# Patient Record
Sex: FEMALE | Race: WHITE | NOT HISPANIC OR LATINO | URBAN - METROPOLITAN AREA
[De-identification: names, ages, dates, MRNs, and addresses within clinical notes are randomized per-mention and may not be internally consistent; named-entity substitution may affect disease eponyms.]

---

## 2021-11-04 ENCOUNTER — TELEPHONE (OUTPATIENT)
Dept: HEMATOLOGY/ONCOLOGY | Facility: CLINIC | Age: 26
End: 2021-11-04
Payer: COMMERCIAL

## 2021-11-09 ENCOUNTER — OFFICE VISIT (OUTPATIENT)
Dept: HEMATOLOGY/ONCOLOGY | Facility: CLINIC | Age: 26
End: 2021-11-09
Payer: COMMERCIAL

## 2021-11-09 VITALS
WEIGHT: 101.63 LBS | TEMPERATURE: 98 F | DIASTOLIC BLOOD PRESSURE: 77 MMHG | RESPIRATION RATE: 16 BRPM | HEART RATE: 70 BPM | HEIGHT: 63 IN | OXYGEN SATURATION: 96 % | BODY MASS INDEX: 18.01 KG/M2 | SYSTOLIC BLOOD PRESSURE: 115 MMHG

## 2021-11-09 DIAGNOSIS — Z15.09 MLH1-RELATED LYNCH SYNDROME (HNPCC2): Primary | ICD-10-CM

## 2021-11-09 DIAGNOSIS — Z14.8 CARRIER OF GENE FOR LYNCH SYNDROME: ICD-10-CM

## 2021-11-09 PROCEDURE — 3078F DIAST BP <80 MM HG: CPT | Mod: CPTII,S$GLB,, | Performed by: NURSE PRACTITIONER

## 2021-11-09 PROCEDURE — 1159F MED LIST DOCD IN RCRD: CPT | Mod: CPTII,S$GLB,, | Performed by: NURSE PRACTITIONER

## 2021-11-09 PROCEDURE — 99205 OFFICE O/P NEW HI 60 MIN: CPT | Mod: S$GLB,,, | Performed by: NURSE PRACTITIONER

## 2021-11-09 PROCEDURE — 3008F BODY MASS INDEX DOCD: CPT | Mod: CPTII,S$GLB,, | Performed by: NURSE PRACTITIONER

## 2021-11-09 PROCEDURE — 1159F PR MEDICATION LIST DOCUMENTED IN MEDICAL RECORD: ICD-10-PCS | Mod: CPTII,S$GLB,, | Performed by: NURSE PRACTITIONER

## 2021-11-09 PROCEDURE — 3074F PR MOST RECENT SYSTOLIC BLOOD PRESSURE < 130 MM HG: ICD-10-PCS | Mod: CPTII,S$GLB,, | Performed by: NURSE PRACTITIONER

## 2021-11-09 PROCEDURE — 3008F PR BODY MASS INDEX (BMI) DOCUMENTED: ICD-10-PCS | Mod: CPTII,S$GLB,, | Performed by: NURSE PRACTITIONER

## 2021-11-09 PROCEDURE — 3078F PR MOST RECENT DIASTOLIC BLOOD PRESSURE < 80 MM HG: ICD-10-PCS | Mod: CPTII,S$GLB,, | Performed by: NURSE PRACTITIONER

## 2021-11-09 PROCEDURE — 3074F SYST BP LT 130 MM HG: CPT | Mod: CPTII,S$GLB,, | Performed by: NURSE PRACTITIONER

## 2021-11-09 PROCEDURE — 99205 PR OFFICE/OUTPT VISIT, NEW, LEVL V, 60-74 MIN: ICD-10-PCS | Mod: S$GLB,,, | Performed by: NURSE PRACTITIONER

## 2021-11-09 RX ORDER — ESCITALOPRAM OXALATE 20 MG/1
TABLET ORAL
COMMUNITY
Start: 2021-08-25 | End: 2022-03-15 | Stop reason: ALTCHOICE

## 2021-12-08 ENCOUNTER — TELEPHONE (OUTPATIENT)
Dept: HEMATOLOGY/ONCOLOGY | Facility: CLINIC | Age: 26
End: 2021-12-08
Payer: COMMERCIAL

## 2021-12-08 ENCOUNTER — PATIENT MESSAGE (OUTPATIENT)
Dept: HEMATOLOGY/ONCOLOGY | Facility: CLINIC | Age: 26
End: 2021-12-08
Payer: COMMERCIAL

## 2021-12-09 ENCOUNTER — TELEPHONE (OUTPATIENT)
Dept: HEMATOLOGY/ONCOLOGY | Facility: CLINIC | Age: 26
End: 2021-12-09
Payer: COMMERCIAL

## 2022-03-15 ENCOUNTER — OFFICE VISIT (OUTPATIENT)
Dept: GASTROENTEROLOGY | Facility: CLINIC | Age: 27
End: 2022-03-15
Payer: COMMERCIAL

## 2022-03-15 VITALS
HEART RATE: 97 BPM | HEIGHT: 63 IN | BODY MASS INDEX: 17.72 KG/M2 | SYSTOLIC BLOOD PRESSURE: 115 MMHG | OXYGEN SATURATION: 99 % | WEIGHT: 100 LBS | DIASTOLIC BLOOD PRESSURE: 76 MMHG

## 2022-03-15 DIAGNOSIS — R19.4 CHANGE IN BOWEL HABITS: ICD-10-CM

## 2022-03-15 DIAGNOSIS — K92.1 HEMATOCHEZIA: ICD-10-CM

## 2022-03-15 DIAGNOSIS — R53.83 FATIGUE, UNSPECIFIED TYPE: ICD-10-CM

## 2022-03-15 DIAGNOSIS — Z80.0 FAMILY HISTORY OF COLON CANCER: ICD-10-CM

## 2022-03-15 DIAGNOSIS — Z15.09 LYNCH SYNDROME: ICD-10-CM

## 2022-03-15 DIAGNOSIS — Z15.09 MLH1-RELATED LYNCH SYNDROME (HNPCC2): Primary | ICD-10-CM

## 2022-03-15 PROCEDURE — 3074F SYST BP LT 130 MM HG: CPT | Mod: CPTII,S$GLB,, | Performed by: INTERNAL MEDICINE

## 2022-03-15 PROCEDURE — 1160F RVW MEDS BY RX/DR IN RCRD: CPT | Mod: CPTII,S$GLB,, | Performed by: INTERNAL MEDICINE

## 2022-03-15 PROCEDURE — 3008F PR BODY MASS INDEX (BMI) DOCUMENTED: ICD-10-PCS | Mod: CPTII,S$GLB,, | Performed by: INTERNAL MEDICINE

## 2022-03-15 PROCEDURE — 3078F PR MOST RECENT DIASTOLIC BLOOD PRESSURE < 80 MM HG: ICD-10-PCS | Mod: CPTII,S$GLB,, | Performed by: INTERNAL MEDICINE

## 2022-03-15 PROCEDURE — 1160F PR REVIEW ALL MEDS BY PRESCRIBER/CLIN PHARMACIST DOCUMENTED: ICD-10-PCS | Mod: CPTII,S$GLB,, | Performed by: INTERNAL MEDICINE

## 2022-03-15 PROCEDURE — 99213 PR OFFICE/OUTPT VISIT, EST, LEVL III, 20-29 MIN: ICD-10-PCS | Mod: S$GLB,,, | Performed by: INTERNAL MEDICINE

## 2022-03-15 PROCEDURE — 1159F PR MEDICATION LIST DOCUMENTED IN MEDICAL RECORD: ICD-10-PCS | Mod: CPTII,S$GLB,, | Performed by: INTERNAL MEDICINE

## 2022-03-15 PROCEDURE — 1159F MED LIST DOCD IN RCRD: CPT | Mod: CPTII,S$GLB,, | Performed by: INTERNAL MEDICINE

## 2022-03-15 PROCEDURE — 3008F BODY MASS INDEX DOCD: CPT | Mod: CPTII,S$GLB,, | Performed by: INTERNAL MEDICINE

## 2022-03-15 PROCEDURE — 99213 OFFICE O/P EST LOW 20 MIN: CPT | Mod: S$GLB,,, | Performed by: INTERNAL MEDICINE

## 2022-03-15 PROCEDURE — 3078F DIAST BP <80 MM HG: CPT | Mod: CPTII,S$GLB,, | Performed by: INTERNAL MEDICINE

## 2022-03-15 PROCEDURE — 3074F PR MOST RECENT SYSTOLIC BLOOD PRESSURE < 130 MM HG: ICD-10-PCS | Mod: CPTII,S$GLB,, | Performed by: INTERNAL MEDICINE

## 2022-03-15 RX ORDER — VENLAFAXINE HYDROCHLORIDE 150 MG/1
CAPSULE, EXTENDED RELEASE ORAL
COMMUNITY
Start: 2021-11-30

## 2022-03-15 RX ORDER — SOD SULF/POT CHLORIDE/MAG SULF 1.479 G
12 TABLET ORAL DAILY
Qty: 24 TABLET | Refills: 0 | Status: SHIPPED | OUTPATIENT
Start: 2022-03-15 | End: 2023-06-05

## 2022-03-15 NOTE — LETTER
March 15, 2022        Karis Johnston MD  [address]  [address]  [facility]  [place] [address]             [facility]  [address] [address]  Phone: [phone]  Fax: [phone]   Patient: Yanique Quintanilla   MR Number: [id number]   YOB: 1995   Date of Visit: 3/15/2022       Dear Dr. Johnston:    Thank you for referring Yanique Quintanilla to me for evaluation. Attached you will find relevant portions of my assessment and plan of care.    If you have questions, please do not hesitate to call me. I look forward to following Yanique Quintanilla along with you.    Sincerely,      Carmen Enriquez MD            CC  No Recipients    Enclosure

## 2022-03-15 NOTE — PROGRESS NOTES
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 Diagnoses of Wood syndrome, Family history of colon cancer, Change in bowel habits, Hematochezia, and Fatigue, unspecified type were also pertinent to this visit.    PCP: Karis Johnston   [address] [facility] /*    HPI:  This is a 26 y.o. female who is being seen for a follow up visit. Patient with Wood syndrome. She reports yesterday having abdominal pain. She states BMs every 3-4 days, and used to be daily and now having pellet like stool x 2 months. She had bright red blood in stool last Tuesday but no blood since then. Denies melena. Reports feeling fatigued x month. Started taking Effexor months ago which was giving her energy but does not anymore.     Family Hx of colon cancer. Mother diagnosed at age 30  Last colonoscopy 3/26/2021: nl    Review of Systems   Constitutional: Positive for fatigue. Negative for chills, diaphoresis, fever and unexpected weight change.   HENT: Negative for mouth sores, nosebleeds, postnasal drip, sore throat, trouble swallowing and voice change.    Eyes: Negative for pain, discharge and eye dryness.   Respiratory: Positive for shortness of breath. Negative for apnea, cough, choking, chest tightness and wheezing.    Cardiovascular: Negative for chest pain, palpitations, leg swelling and claudication.   Gastrointestinal: Positive for abdominal pain, blood in stool, change in bowel habit, constipation and change in bowel habit. Negative for abdominal distention, anal bleeding, diarrhea, nausea, rectal pain, vomiting, reflux and fecal incontinence.   Genitourinary: Negative for bladder incontinence, difficulty urinating, dysuria, flank pain, frequency and hematuria.   Musculoskeletal: Negative for arthralgias, back pain, joint swelling and joint deformity.   Integumentary:  Negative for color change, rash and wound.  "  Allergic/Immunologic: Negative for environmental allergies and food allergies.   Neurological: Negative for seizures, facial asymmetry, speech difficulty, weakness, headaches and memory loss.   Hematological: Negative for adenopathy. Does not bruise/bleed easily.   Psychiatric/Behavioral: Negative for agitation, behavioral problems, confusion, hallucinations and sleep disturbance.      Past Medical History:   Diagnosis Date    Depression     Wood syndrome      Past Surgical History:   Procedure Laterality Date    COLONOSCOPY  03/2021    Due every year     Family History   Problem Relation Age of Onset    Colon cancer Mother     Colon cancer Maternal Uncle     Colon cancer Maternal Grandmother     Prostate cancer Maternal Grandfather      Social History     Tobacco Use    Smoking status: Never Smoker    Smokeless tobacco: Never Used   Substance Use Topics    Alcohol use: Yes     Comment: Socially    Drug use: Not Currently     Review of patient's allergies indicates:   Allergen Reactions    Penicillins       Medication List with Changes/Refills   New Medications    SOD SULF-POT CHLORIDE-MAG SULF (SUTAB) 1.479-0.188- 0.225 GRAM TABLET    Take 12 tablets by mouth once daily. Take according to package instructions with indicated amount of water. No breakfast day before test.   Current Medications    ESCITALOPRAM OXALATE (LEXAPRO) 20 MG TABLET        VENLAFAXINE (EFFEXOR-XR) 150 MG CP24             Vital Signs:  /76   Pulse 97   Ht 5' 3" (1.6 m)   Wt 45.4 kg (100 lb)   SpO2 99%   BMI 17.71 kg/m²   Body mass index is 17.71 kg/m².      Physical Exam  Vitals reviewed.   Constitutional:       General: She is awake. She is not in acute distress.     Appearance: Normal appearance. She is well-developed. She is not ill-appearing, toxic-appearing or diaphoretic.   HENT:      Head: Normocephalic and atraumatic.      Nose: Nose normal.      Mouth/Throat:      Mouth: Mucous membranes are moist.      " Pharynx: Oropharynx is clear. No oropharyngeal exudate or posterior oropharyngeal erythema.   Eyes:      General: Lids are normal. Gaze aligned appropriately. No scleral icterus.        Right eye: No discharge.         Left eye: No discharge.      Extraocular Movements: Extraocular movements intact.      Conjunctiva/sclera: Conjunctivae normal.   Neck:      Trachea: Trachea normal.   Cardiovascular:      Rate and Rhythm: Normal rate and regular rhythm.      Pulses:           Radial pulses are 2+ on the right side and 2+ on the left side.   Pulmonary:      Effort: Pulmonary effort is normal. No respiratory distress.      Breath sounds: Normal breath sounds. No stridor. No wheezing or rhonchi.   Chest:      Chest wall: No tenderness.   Abdominal:      General: Abdomen is flat. Bowel sounds are normal. There is no distension.      Palpations: Abdomen is soft. There is no fluid wave, hepatomegaly or mass.      Tenderness: There is no abdominal tenderness. There is no guarding or rebound.   Musculoskeletal:         General: No tenderness or deformity.      Cervical back: Full passive range of motion without pain and neck supple. No tenderness.      Right lower leg: No edema.      Left lower leg: No edema.   Lymphadenopathy:      Cervical: No cervical adenopathy.   Skin:     General: Skin is warm and dry.      Capillary Refill: Capillary refill takes less than 2 seconds.      Coloration: Skin is not cyanotic, jaundiced or pale.      Findings: No rash.   Neurological:      General: No focal deficit present.      Mental Status: She is alert and oriented to person, place, and time.      Cranial Nerves: No facial asymmetry.      Motor: No tremor.   Psychiatric:         Attention and Perception: Attention normal.         Mood and Affect: Mood and affect normal.         Speech: Speech normal.         Behavior: Behavior normal. Behavior is cooperative.          Labs: Pertinent labs reviewed.    All of the data above and below  has been reviewed by myself and any further interpretations will be reflected in the assessment and plan.   The data includes review of external notes, and independent interpretation of lab results, procedures, x-rays, and imaging reports.      Assessment:  MLH1-related Wood syndrome (HNPCC2)  -     Ambulatory Referral to External Surgery    Wood syndrome    Family history of colon cancer    Change in bowel habits  -     Ambulatory Referral to External Surgery  -     CBC Auto Differential; Future; Expected date: 03/15/2022  -     Comprehensive Metabolic Panel; Future; Expected date: 03/15/2022  -     TSH; Future; Expected date: 03/15/2022    Hematochezia  -     Ambulatory Referral to External Surgery    Fatigue, unspecified type  -     CBC Auto Differential; Future; Expected date: 03/15/2022  -     Comprehensive Metabolic Panel; Future; Expected date: 03/15/2022  -     TSH; Future; Expected date: 03/15/2022    Other orders  -     sod sulf-pot chloride-mag sulf (SUTAB) 1.479-0.188- 0.225 gram tablet; Take 12 tablets by mouth once daily. Take according to package instructions with indicated amount of water. No breakfast day before test.  Dispense: 24 tablet; Refill: 0    DDx includes tumor v hemorrhoid v infl.     Recommendations:  Schedule colonoscopy  Get blood work done.    Risks, benefits, and alternatives of medical management, any associated procedures, and/or treatment discussed with the patient. Patient given opportunity to ask questions and voices understanding. Patient has elected to proceed with the recommended care modalities as discussed.    No follow-ups on file.    Order summary:  Orders Placed This Encounter   Procedures    CBC Auto Differential    Comprehensive Metabolic Panel    TSH    Ambulatory Referral to External Surgery          Carmen Enriquez MD    This document was created using a voice recognition transcribing system. Incorrect words or phrases may have been missed during proofreading.  Please interpret accordingly or contact me for clarification.

## 2022-03-21 LAB
ABS NRBC COUNT: 0 X 10 3/UL (ref 0–0.01)
ABSOLUTE BASOPHIL: 0.03 X 10 3/UL (ref 0–0.22)
ABSOLUTE EOSINOPHIL: 0.04 X 10 3/UL (ref 0.04–0.54)
ABSOLUTE IMMATURE GRAN: 0.02 X 10 3/UL (ref 0–0.04)
ABSOLUTE LYMPHOCYTE: 2.38 X 10 3/UL (ref 0.86–4.75)
ABSOLUTE MONOCYTE: 0.47 X 10 3/UL (ref 0.22–1.08)
ALBUMIN SERPL-MCNC: 4.7 G/DL (ref 3.5–5.2)
ALBUMIN/GLOB SERPL ELPH: 2.1 {RATIO} (ref 1–2.7)
ALP ISOS SERPL LEV INH-CCNC: 74 U/L (ref 35–105)
ALT (SGPT): 10 U/L (ref 0–33)
ANION GAP SERPL CALC-SCNC: 11 MMOL/L (ref 8–17)
AST SERPL-CCNC: 12 U/L (ref 0–32)
BASOPHILS NFR BLD: 0.3 % (ref 0.2–1.2)
BILIRUBIN, TOTAL: 0.22 MG/DL (ref 0–1.2)
BUN/CREAT SERPL: 14.5 (ref 6–20)
CALCIUM SERPL-MCNC: 9.7 MG/DL (ref 8.6–10.2)
CARBON DIOXIDE, CO2: 29 MMOL/L (ref 22–29)
CHLORIDE: 103 MMOL/L (ref 98–107)
CREAT SERPL-MCNC: 0.56 MG/DL (ref 0.5–0.9)
EOSINOPHIL NFR BLD: 0.4 % (ref 0.7–7)
GFR ESTIMATION: 130.86
GLOBULIN: 2.2 G/DL (ref 1.5–4.5)
GLUCOSE: 95 MG/DL (ref 74–106)
HCT VFR BLD AUTO: 38.9 % (ref 37–47)
HGB BLD-MCNC: 12.5 G/DL (ref 12–16)
IMMATURE GRANULOCYTES: 0.2 % (ref 0–0.5)
LYMPHOCYTES NFR BLD: 25.7 % (ref 19.3–53.1)
MCH RBC QN AUTO: 28.1 PG (ref 27–32)
MCHC RBC AUTO-ENTMCNC: 32.1 G/DL (ref 32–36)
MCV RBC AUTO: 87.4 FL (ref 82–100)
MONOCYTES NFR BLD: 5.1 % (ref 4.7–12.5)
NEUTROPHILS # BLD AUTO: 6.32 X 10 3/UL (ref 2.15–7.56)
NEUTROPHILS NFR BLD: 68.3 % (ref 34–71.1)
NUCLEATED RED BLOOD CELLS: 0 /100 WBC (ref 0–0.2)
PLATELET # BLD AUTO: 302 X 10 3/UL (ref 135–400)
POTASSIUM: 3.8 MMOL/L (ref 3.5–5.1)
PROT SNV-MCNC: 6.9 G/DL (ref 6.4–8.3)
RBC # BLD AUTO: 4.45 X 10 6/UL (ref 4.2–5.4)
RDW-SD: 42.4 FL (ref 37–54)
SODIUM: 143 MMOL/L (ref 136–145)
TSH SERPL DL<=0.005 MIU/L-ACNC: 1.55 UIU/ML (ref 0.27–4.2)
UREA NITROGEN (BUN): 8.1 MG/DL (ref 6–20)
WBC # BLD: 9.26 X 10 3/UL (ref 4.3–10.8)

## 2022-03-23 NOTE — PROGRESS NOTES
CBC/CMP/TSH nl.   CMA to notify patient. Her blood tests look well including her blood counts, liver, kidneys and thyroid function. Keep procedure as scheduled.  NBP

## 2022-03-24 ENCOUNTER — TELEPHONE (OUTPATIENT)
Dept: GASTROENTEROLOGY | Facility: CLINIC | Age: 27
End: 2022-03-24
Payer: COMMERCIAL

## 2022-03-24 NOTE — TELEPHONE ENCOUNTER
I left a voicemail with the results and asking the patient to contact us with any questions.  SUZYL, XENIA

## 2022-03-24 NOTE — TELEPHONE ENCOUNTER
----- Message from Carmen Enriquez MD sent at 3/23/2022  6:27 AM CDT -----  CBC/CMP/TSH nl.   CMA to notify patient. Her blood tests look well including her blood counts, liver, kidneys and thyroid function. Keep procedure as scheduled.  NBP

## 2022-03-25 ENCOUNTER — TELEPHONE (OUTPATIENT)
Dept: ADMINISTRATIVE | Facility: CLINIC | Age: 27
End: 2022-03-25
Payer: COMMERCIAL

## 2022-03-31 ENCOUNTER — OUTSIDE PLACE OF SERVICE (OUTPATIENT)
Dept: GASTROENTEROLOGY | Facility: CLINIC | Age: 27
End: 2022-03-31
Payer: COMMERCIAL

## 2022-03-31 LAB — CRC RECOMMENDATION EXT: NORMAL

## 2022-03-31 PROCEDURE — 45385 COLONOSCOPY W/LESION REMOVAL: CPT | Mod: ,,, | Performed by: INTERNAL MEDICINE

## 2022-03-31 PROCEDURE — 45385 PR COLONOSCOPY,REMV LESN,SNARE: ICD-10-PCS | Mod: ,,, | Performed by: INTERNAL MEDICINE

## 2022-04-05 ENCOUNTER — TELEPHONE (OUTPATIENT)
Dept: GASTROENTEROLOGY | Facility: CLINIC | Age: 27
End: 2022-04-05

## 2022-04-05 NOTE — TELEPHONE ENCOUNTER
Results and recommendations discussed with patient, who voices understanding and agreement. They will contact us with any issues.  SUZYL, XENIA

## 2022-12-15 ENCOUNTER — OFFICE VISIT (OUTPATIENT)
Dept: MATERNAL FETAL MEDICINE | Facility: CLINIC | Age: 27
End: 2022-12-15
Payer: COMMERCIAL

## 2022-12-15 VITALS
HEART RATE: 78 BPM | OXYGEN SATURATION: 100 % | RESPIRATION RATE: 20 BRPM | DIASTOLIC BLOOD PRESSURE: 74 MMHG | WEIGHT: 112 LBS | BODY MASS INDEX: 19.84 KG/M2 | SYSTOLIC BLOOD PRESSURE: 120 MMHG | HEIGHT: 63 IN

## 2022-12-15 DIAGNOSIS — O44.02 PLACENTA PREVIA IN SECOND TRIMESTER: Primary | ICD-10-CM

## 2022-12-15 DIAGNOSIS — O44.02 PLACENTA PREVIA IN SECOND TRIMESTER: ICD-10-CM

## 2022-12-15 PROCEDURE — 3008F BODY MASS INDEX DOCD: CPT | Mod: CPTII,S$GLB,, | Performed by: OBSTETRICS & GYNECOLOGY

## 2022-12-15 PROCEDURE — 3074F PR MOST RECENT SYSTOLIC BLOOD PRESSURE < 130 MM HG: ICD-10-PCS | Mod: CPTII,S$GLB,, | Performed by: OBSTETRICS & GYNECOLOGY

## 2022-12-15 PROCEDURE — 3078F DIAST BP <80 MM HG: CPT | Mod: CPTII,S$GLB,, | Performed by: OBSTETRICS & GYNECOLOGY

## 2022-12-15 PROCEDURE — 99204 OFFICE O/P NEW MOD 45 MIN: CPT | Mod: 25,S$GLB,, | Performed by: OBSTETRICS & GYNECOLOGY

## 2022-12-15 PROCEDURE — 3078F PR MOST RECENT DIASTOLIC BLOOD PRESSURE < 80 MM HG: ICD-10-PCS | Mod: CPTII,S$GLB,, | Performed by: OBSTETRICS & GYNECOLOGY

## 2022-12-15 PROCEDURE — 1159F MED LIST DOCD IN RCRD: CPT | Mod: CPTII,S$GLB,, | Performed by: OBSTETRICS & GYNECOLOGY

## 2022-12-15 PROCEDURE — 99204 PR OFFICE/OUTPT VISIT, NEW, LEVL IV, 45-59 MIN: ICD-10-PCS | Mod: 25,S$GLB,, | Performed by: OBSTETRICS & GYNECOLOGY

## 2022-12-15 PROCEDURE — 1159F PR MEDICATION LIST DOCUMENTED IN MEDICAL RECORD: ICD-10-PCS | Mod: CPTII,S$GLB,, | Performed by: OBSTETRICS & GYNECOLOGY

## 2022-12-15 PROCEDURE — 3008F PR BODY MASS INDEX (BMI) DOCUMENTED: ICD-10-PCS | Mod: CPTII,S$GLB,, | Performed by: OBSTETRICS & GYNECOLOGY

## 2022-12-15 PROCEDURE — 76811 PR US, OB FETAL EVAL & EXAM, TRANSABDOM,FIRST GESTATION: ICD-10-PCS | Mod: S$GLB,,, | Performed by: OBSTETRICS & GYNECOLOGY

## 2022-12-15 PROCEDURE — 76811 OB US DETAILED SNGL FETUS: CPT | Mod: S$GLB,,, | Performed by: OBSTETRICS & GYNECOLOGY

## 2022-12-15 PROCEDURE — 3074F SYST BP LT 130 MM HG: CPT | Mod: CPTII,S$GLB,, | Performed by: OBSTETRICS & GYNECOLOGY

## 2022-12-15 RX ORDER — BUSPIRONE HYDROCHLORIDE 10 MG/1
TABLET ORAL
COMMUNITY
Start: 2022-09-19

## 2022-12-15 NOTE — PROGRESS NOTES
"Yanique is here for initial MFM consultation, referred by Dr. Quintanilla for vasa previa and marginal cord insertion.    She is feeling fetal movement.    Yanique denies vaginal bleeding, loss of fluid, recurrent cramping.    She rarely takes Buspar per her own report, it replaced her Effexor which was discontinued with pregnancy.      Vitals:    12/15/22 0934   BP: 120/74   Pulse: 78   Resp: 20   SpO2: 100%   Weight: 50.8 kg (112 lb)   Height: 5' 3" (1.6 m)      BMI:                    19.84 kg/m^2               "

## 2022-12-15 NOTE — PROGRESS NOTES
Initial M Consultation Note  Cipriano Kim MD    Reason for consultation:    1. Pregnancy at 22 weeks' gestation  2. Screening ultrasound suspicious for Vasa previa and marginal cord insertion  3. Maternal Wood syndrome  4. 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.  I appreciate your request for both imaging and consultation.Your patient is a 27-year-old  1 para 0 at 22 weeks' gestation.  The patient had an ultrasound that was suspicious for blood vessels over the internal cervical os obtained during her anatomic survey.  Additionally, there was suspicion over a abnormal insertion of the umbilical cord into the placenta.    There are multiple family members with Wood syndrome.  This is hereditary non polyposis colorectal cancer.  It is an inherited genetic condition which increases the risk of developing cancers such as colon and endometrial cancer as the individual ages.  The patient's mother was diagnosed at age 30 with colon cancer.  She is .  Multiple family members have been affected including uncle who is had 2 bouts of colon cancer.  The patient is on a program of a yearly colonoscopy.    Additionally, the patient has had carrier testing.  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  This is a very rare disorder with a low Christal a rate in the general population.  It affects glucose metabolism.  The phenotype is a very wide ranging.  The  is going to get tested after the  of the year.  The probability of him being a carrier as well as quite low.  However, if he is a carrier then the child would have a 25% chance of these diseases.  Genetic counseling at that point via a  or genetic counselor would be in order.    Past medical history:  The patient denies major medical illnesses such as  hypertension, diabetes, cardiac disease, pulmonary disease, renal disease, autoimmune disease, spontaneous thrombosis, thyroid dysfunction, and neurologic disease.    Obstetric history:  This is the patient's 1st pregnancy.  She denies vaginal bleeding, leakage of fluid, uterine contractions.    Family history:  Noncontributory.  The patient denies a history of congenital cardiac disease, open neural tube defect, aneuploidy, Down syndrome, and common single gene disorders.    Social history:   The patient denies tobacco use, alcohol use, and recreational drug use.    Review of systems:  The patient has no somatic complaints today.  She denies vaginal bleeding, leakage of fluid, and pelvic pressure.  Overall, she states she feels well.    Physical examination  General:  This is a well-developed well-nourished female who appears healthy  Vital signs:  Blood pressure 120/74, pulse 78, respirations 20, weight 112 lb  HEENT:  Grossly within normal limits.  There is no obvious facial edema.  The sclera appear normal.  The facial muscles appear normal.  Abdomen:  Benign exam.  The uterus is nontender to palpation.  The uterus is appropriate size for gestational age.  Extremities:  No ankle edema is palpable.  Neuro:  Grossly intact.  The patient is alert and oriented x3.  She ambulates without issue.  Psych:  The patient is appropriate for mood and affect.    Imaging:  Fetal imaging was provided today.  A full ultrasound report has been created on the [email or web address] system.  The some report is placed in the electronic medical record.  In summary, we find a Stone gestation with normal fluid and overall normal anatomy.  Of note, the stomach bubble was not adequately seen during the study.  This is not terribly uncommon.  It does prompt a recommendation for a follow-up study to identified at that time.  Additionally, some loops of bowel are mildly echogenic.  They do not reach criteria for hyperechoic bowel.  Hyperechoic bowel  must be the same echodensity of a bony structure.  This was not noted today.  The exact clinical significance of this is unclear.  Sometimes it can be from swallowed blood.  It can be associated with cystic fibrosis.  It can also be associated with Down syndrome.  The patient's cell free DNA was negative for trisomy 13, trisomy 18, trisomy 21.    Imaging with the vaginal probe transducer including color-flow Doppler analysis did not show any evidence of Vasa previa.  The most likely thing is that there was a loop of cord near the cervix when the ultrasound was scant at your office.  Additionally, I do believe the insertion of the umbilical cord is in the center of the placenta.    Counseling:  The patient is fully aware that the offspring of this pregnancy will also have Wood syndrome.  That can be genetically determined after birth.  We did discuss the abnormal screen that showed a possible abnormality of glucose metabolism.  That should be sorted out when the  gets his testing returned.    We did discuss the ultrasound findings today.  The patient was informed that I feel the probability of Vasa previa and marginal insertion of the placenta are quite low.  We also discussed the mildly hyperechoic bowel.  She was informed that we will see her back in reassess the bowel and also the stomach.    Post Maternal-Fetal Medicine counseling the patient her  expressed understanding.  They both are in good spirits and understand the clinical scenario as described.  I believe that reassurance is in order as I think the probability of successful outcome is high.    Impression:  Status post imaging with no evidence of Vasa previa or marginal insertion of the umbilical cord.  The patient is a carrier for Wood syndrome but she is well-versed on this disease and understands its genetics and treatment.  New finding of mildly echogenic bowel of unknown etiology.  Repeat ultrasound is  suggested.    Recommendations:    1. Will, please schedule the patient for a follow-up ultrasound at our [facility] in approximately 4 weeks.    2. At the time of the next visit we will reimage the fetus taking particular care to look for the stomach bubble, echodensity of the bowel, and do color-flow Doppler just above the cervix.    Please call me or one of my partners in [place] if you have any question about your patient at [phone].  Thank you very much for the opportunity to see your patient.    Sincerely, Cipriano Kim MD

## 2022-12-15 NOTE — LETTER
December 15, 2022        ELIZABETH Quintanilla Jr., MD  [address]  [address]  [facility] [address]             [facility]  [address]  [place] [address]  Phone: [phone]  Fax: [phone]   Patient: Yanique Quintanilla   MR Number: [id number]   YOB: 1995   Date of Visit: 12/15/2022       Dear Dr. Quintanilla:    Thank you for referring Yanique Quintanilla to me for evaluation. Attached you will find relevant portions of my assessment and plan of care.    If you have questions, please do not hesitate to call me. I look forward to following Yanique Quintanilla along with you.    Sincerely,      Cipriano Adkins MD        CC  MD Burak Adhikariosure

## 2023-07-25 ENCOUNTER — TELEPHONE (OUTPATIENT)
Dept: ADMINISTRATIVE | Facility: CLINIC | Age: 28
End: 2023-07-25
Payer: COMMERCIAL

## 2023-07-27 ENCOUNTER — TELEPHONE (OUTPATIENT)
Dept: GASTROENTEROLOGY | Facility: CLINIC | Age: 28
End: 2023-07-27
Payer: COMMERCIAL

## 2023-07-27 VITALS — HEIGHT: 63 IN | WEIGHT: 96 LBS | BODY MASS INDEX: 17.01 KG/M2

## 2023-07-27 DIAGNOSIS — Z86.010 PERSONAL HISTORY OF COLONIC POLYPS: ICD-10-CM

## 2023-07-27 DIAGNOSIS — Z80.0 FAMILY HISTORY OF COLON CANCER: ICD-10-CM

## 2023-07-27 DIAGNOSIS — Z15.09 MLH1-RELATED LYNCH SYNDROME (HNPCC2): Primary | ICD-10-CM

## 2023-07-27 NOTE — TELEPHONE ENCOUNTER
"[facility]  [address] [place] [address]  Phone: [phone]  Fax: [phone]    History & Physical         Provider: Dr. Carmen Enriquez    Patient Name: Yanique ESTRADA (age):1995  28 y.o.           Gender: female   Phone: [phone]     Referring Physician: Karis Johnston     Vital Signs:   Height - 5' 3"  Weight - 96 lb  BMI -  17.01    Plan: Colonoscopy @ [facility]    Encounter Diagnoses   Name Primary?    MLH1-related Wood syndrome (HNPCC2) Yes    Family history of colon cancer     Personal history of colonic polyps            History:      Past Medical History:   Diagnosis Date    Depression     Hx of colonic polyps     Wood syndrome       Past Surgical History:   Procedure Laterality Date    COLONOSCOPY  2021    Due every year    COLONOSCOPY      EXTERNAL EAR SURGERY Bilateral 2019      Medication List with Changes/Refills   Current Medications    BUSPIRONE (BUSPAR) 10 MG TABLET        SERTRALINE (ZOLOFT) 50 MG TABLET    Take 50 mg by mouth once daily.    SOD SULF-POT CHLORIDE-MAG SULF (SUTAB) 1.479-0.188- 0.225 GRAM TABLET    Take 12 tablets by mouth once daily. Take according to package instructions with indicated amount of water. No breakfast day before test. May substitute with Suprep, Clenpiq, Plenvu, Moviprep or GoLytely based on Rx plan and patient preference.    VENLAFAXINE (EFFEXOR-XR) 150 MG CP24          Review of patient's allergies indicates:   Allergen Reactions    Penicillins Hives      Family History   Problem Relation Age of Onset    Colon cancer Mother     No Known Problems Father     No Known Problems Sister     Colon polyps Brother     No Known Problems Brother     Colon cancer Maternal Grandmother     Prostate cancer Maternal Grandfather     No Known Problems Paternal Grandmother     No Known Problems Paternal Grandfather     Colon cancer Maternal Uncle     "   Social History     Tobacco Use    Smoking status: Never    Smokeless tobacco: Never   Substance Use Topics    Alcohol use: Never    Drug use: Not Currently        Physical Examination:     General Appearance:___________________________  HEENT: _____________________________________  Abdomen:____________________________________  Heart:________________________________________  Lungs:_______________________________________  Extremities:___________________________________  Skin:_________________________________________  Endocrine:____________________________________  Genitourinary:_________________________________  Neurological:__________________________________      Patient has been evaluated immediately prior to sedation and is medically cleared for endoscopy with IVCS as an ASA class: ______      Physician Signature: _________________________       Date: ________  Time: ________

## 2023-08-03 ENCOUNTER — OUTSIDE PLACE OF SERVICE (OUTPATIENT)
Dept: GASTROENTEROLOGY | Facility: CLINIC | Age: 28
End: 2023-08-03

## 2023-08-03 LAB — CRC RECOMMENDATION EXT: NORMAL

## 2023-08-03 PROCEDURE — G0105 COLORECTAL SCRN; HI RISK IND: HCPCS | Mod: ,,, | Performed by: INTERNAL MEDICINE

## 2023-08-03 PROCEDURE — G0105 COLORECTAL SCRN; HI RISK IND: ICD-10-PCS | Mod: ,,, | Performed by: INTERNAL MEDICINE

## 2023-12-15 ENCOUNTER — DOCUMENTATION ONLY (OUTPATIENT)
Dept: GASTROENTEROLOGY | Facility: CLINIC | Age: 28
End: 2023-12-15

## 2024-08-09 ENCOUNTER — TELEPHONE (OUTPATIENT)
Dept: GASTROENTEROLOGY | Facility: CLINIC | Age: 29
End: 2024-08-09
Payer: COMMERCIAL

## 2024-09-03 ENCOUNTER — TELEPHONE (OUTPATIENT)
Dept: GASTROENTEROLOGY | Facility: CLINIC | Age: 29
End: 2024-09-03
Payer: COMMERCIAL

## 2024-09-03 DIAGNOSIS — Z86.010 PERSONAL HISTORY OF COLONIC POLYPS: ICD-10-CM

## 2024-09-03 DIAGNOSIS — Z80.0 FAMILY HISTORY OF COLON CANCER: ICD-10-CM

## 2024-09-03 DIAGNOSIS — Z15.09 MLH1-RELATED LYNCH SYNDROME (HNPCC2): Primary | ICD-10-CM

## 2024-09-03 NOTE — TELEPHONE ENCOUNTER
"[facility]  [address] [place] [address]  Phone: [phone]  Fax: [phone]    History & Physical         Provider: Dr. Carmen Enriquez    Patient Name: Yanique ESTRADA (age):1995  29 y.o.           Gender: female   Phone: [phone]     Referring Physician: Karis Johnston     Vital Signs:   Height - 5' 3"  Weight - 94 lb  BMI -  16.65    Plan: Colonoscopy @ [facility]    Encounter Diagnoses   Name Primary?    MLH1-related Wood syndrome (HNPCC2) Yes    Family history of colon cancer     Personal history of colonic polyps            History:      Past Medical History:   Diagnosis Date    Anxiety     BMI 16.65 2024    Depression     Graves disease     Hx of colonic polyps     Wood syndrome       Past Surgical History:   Procedure Laterality Date    COLONOSCOPY  2023    EXTERNAL EAR SURGERY Bilateral 2019      Medication List with Changes/Refills   Current Medications    PROPYLTHIOURACIL (PTU) 50 MG TAB        SERTRALINE (ZOLOFT) 50 MG TABLET    Take 50 mg by mouth once daily.    SOD SULF-POT CHLORIDE-MAG SULF (SUTAB) 1.479-0.188- 0.225 GRAM TABLET    Take according to package instructions with indicated amount of water. No breakfast day before test. May substitute with Suprep, Clenpiq, Plenvu, Moviprep or GoLytely based on Rx plan and patient preference.      Review of patient's allergies indicates:   Allergen Reactions    Penicillins Hives      Family History   Problem Relation Name Age of Onset    Colon cancer Mother  30    No Known Problems Father      No Known Problems Sister      Colon polyps Brother      No Known Problems Brother      Colon cancer Maternal Grandmother  60    Prostate cancer Maternal Grandfather      No Known Problems Paternal Grandmother      No Known Problems Paternal Grandfather      Colon cancer Maternal Uncle  40      Social History     Tobacco Use    " Smoking status: Never    Smokeless tobacco: Never   Substance Use Topics    Alcohol use: Never    Drug use: Not Currently        Physical Examination:     General Appearance:___________________________  HEENT: _____________________________________  Abdomen:____________________________________  Heart:________________________________________  Lungs:_______________________________________  Extremities:___________________________________  Skin:_________________________________________  Endocrine:____________________________________  Genitourinary:_________________________________  Neurological:__________________________________      Patient has been evaluated immediately prior to sedation and is medically cleared for endoscopy with IVCS as an ASA class: ______      Physician Signature: _________________________       Date: ________  Time: ________

## 2024-09-17 ENCOUNTER — TELEPHONE (OUTPATIENT)
Dept: GASTROENTEROLOGY | Facility: CLINIC | Age: 29
End: 2024-09-17

## 2024-09-17 ENCOUNTER — OUTSIDE PLACE OF SERVICE (OUTPATIENT)
Dept: GASTROENTEROLOGY | Facility: CLINIC | Age: 29
End: 2024-09-17

## 2024-09-17 DIAGNOSIS — Z86.010 PERSONAL HISTORY OF COLONIC POLYPS: ICD-10-CM

## 2024-09-17 DIAGNOSIS — Z15.09 MLH1-RELATED LYNCH SYNDROME (HNPCC2): Primary | ICD-10-CM

## 2024-09-17 DIAGNOSIS — Z80.0 FAMILY HISTORY OF COLON CANCER: ICD-10-CM

## 2024-09-17 LAB
B-HCG UR QL: NEGATIVE
SPECIFIC GRAVITY,UA,REFRACTOMETER: 1.02 (ref 1–1.03)

## 2024-09-18 NOTE — TELEPHONE ENCOUNTER
Called and spoke with patient. Colonoscopy scheduled on Wednesday September 17, 2025 w/ NBNELIDA at [facility]. OV scheduled on Tuesday August 26, 2025 at 9:00 am w/ SHIMA. JOSE

## 2024-09-18 NOTE — TELEPHONE ENCOUNTER
Schedule repeat colonoscopy on one year. I rec NP OV 3 weeks prior to that as she will be 31yo and would need to discuss completing upper endoscopy with colonoscopy. She saw VIN Guy in 2021 to review her overall Wood management. If she ever would like an updated OV with her, then we can message the clinic to request. She would need to let us or [facility] know.  DOROTA